# Patient Record
Sex: FEMALE | Race: WHITE | URBAN - METROPOLITAN AREA
[De-identification: names, ages, dates, MRNs, and addresses within clinical notes are randomized per-mention and may not be internally consistent; named-entity substitution may affect disease eponyms.]

---

## 2021-10-08 ENCOUNTER — APPOINTMENT (OUTPATIENT)
Dept: URBAN - METROPOLITAN AREA SURGERY 9 | Age: 56
Setting detail: DERMATOLOGY
End: 2021-10-08

## 2021-10-08 DIAGNOSIS — D485 NEOPLASM OF UNCERTAIN BEHAVIOR OF SKIN: ICD-10-CM

## 2021-10-08 DIAGNOSIS — L82.0 INFLAMED SEBORRHEIC KERATOSIS: ICD-10-CM

## 2021-10-08 DIAGNOSIS — D18.0 HEMANGIOMA: ICD-10-CM

## 2021-10-08 PROBLEM — D37.01 NEOPLASM OF UNCERTAIN BEHAVIOR OF LIP: Status: ACTIVE | Noted: 2021-10-08

## 2021-10-08 PROBLEM — D18.01 HEMANGIOMA OF SKIN AND SUBCUTANEOUS TISSUE: Status: ACTIVE | Noted: 2021-10-08

## 2021-10-08 PROCEDURE — 40490 BIOPSY OF LIP: CPT

## 2021-10-08 PROCEDURE — OTHER TREATMENT REGIMEN: OTHER

## 2021-10-08 PROCEDURE — OTHER COUNSELING: OTHER

## 2021-10-08 PROCEDURE — OTHER BIOPSY BY SHAVE METHOD: OTHER

## 2021-10-08 PROCEDURE — OTHER MIPS QUALITY: OTHER

## 2021-10-08 PROCEDURE — 99202 OFFICE O/P NEW SF 15 MIN: CPT | Mod: 25

## 2021-10-08 PROCEDURE — OTHER LIQUID NITROGEN: OTHER

## 2021-10-08 PROCEDURE — OTHER REASSURANCE: OTHER

## 2021-10-08 PROCEDURE — OTHER OTHER: OTHER

## 2021-10-08 PROCEDURE — 17110 DESTRUCT B9 LESION 1-14: CPT

## 2021-10-08 ASSESSMENT — LOCATION ZONE DERM
LOCATION ZONE: FACE
LOCATION ZONE: LIP

## 2021-10-08 ASSESSMENT — LOCATION DETAILED DESCRIPTION DERM
LOCATION DETAILED: LEFT INFERIOR VERMILION LIP
LOCATION DETAILED: RIGHT MEDIAL ZYGOMA

## 2021-10-08 ASSESSMENT — LOCATION SIMPLE DESCRIPTION DERM
LOCATION SIMPLE: LEFT LIP
LOCATION SIMPLE: RIGHT ZYGOMA

## 2021-10-08 NOTE — PROCEDURE: OTHER
Other (Free Text): Instructed patient to stop picking at it
Detail Level: Detailed
Render Risk Assessment In Note?: no
Note Text (......Xxx Chief Complaint.): This diagnosis correlates with the

## 2021-10-08 NOTE — PROCEDURE: TREATMENT REGIMEN
Plan: If interested in removal, can consider laser vs having it removed by Mohs surgeon
Detail Level: Simple

## 2021-10-08 NOTE — HPI: SKIN LESION
Has Your Skin Lesion Been Treated?: not been treated
Is This A New Presentation, Or A Follow-Up?: Skin Lesion
Is This A New Presentation, Or A Follow-Up?: Skin Lesions
Additional History: Patient wonders if she is biting the lip lesion (that’s on the inside). Her teeth have shifted so she wonders if her bite is off. She said the lesion on the outer lip is just scaly.

## 2021-10-08 NOTE — PROCEDURE: LIQUID NITROGEN
Medical Necessity Information: It is in your best interest to select a reason for this procedure from the list below. All of these items fulfill various CMS LCD requirements except the new and changing color options.
Consent: Informed consent obtained including but not limited to risks of pain, blistering, possibly permanent pigmentary change, recurrence and incomplete removal.
Detail Level: Detailed
Show Aperture Variable?: Yes
Post-Care Instructions: Reviewed in detail post-care instructions.
Medical Necessity Clause: Medical necessity:
Duration Of Freeze Thaw-Cycle (Seconds): 15-20
Include Z78.9 (Other Specified Conditions Influencing Health Status) As An Associated Diagnosis?: No
Number Of Freeze-Thaw Cycles: 1 freeze-thaw cycle